# Patient Record
Sex: MALE
[De-identification: names, ages, dates, MRNs, and addresses within clinical notes are randomized per-mention and may not be internally consistent; named-entity substitution may affect disease eponyms.]

---

## 2019-06-26 ENCOUNTER — APPOINTMENT (OUTPATIENT)
Dept: VASCULAR SURGERY | Facility: CLINIC | Age: 19
End: 2019-06-26
Payer: COMMERCIAL

## 2019-06-26 VITALS — HEIGHT: 67 IN | BODY MASS INDEX: 18.83 KG/M2 | WEIGHT: 120 LBS

## 2019-06-26 DIAGNOSIS — R20.0 ANESTHESIA OF SKIN: ICD-10-CM

## 2019-06-26 DIAGNOSIS — R20.2 ANESTHESIA OF SKIN: ICD-10-CM

## 2019-06-26 DIAGNOSIS — Z78.9 OTHER SPECIFIED HEALTH STATUS: ICD-10-CM

## 2019-06-26 PROBLEM — Z00.00 ENCOUNTER FOR PREVENTIVE HEALTH EXAMINATION: Status: ACTIVE | Noted: 2019-06-26

## 2019-06-26 PROCEDURE — 99243 OFF/OP CNSLTJ NEW/EST LOW 30: CPT

## 2019-06-27 PROBLEM — Z78.9 NON-SMOKER: Status: ACTIVE | Noted: 2019-06-26

## 2019-06-27 PROBLEM — R20.0 NUMBNESS AND TINGLING IN LEFT ARM: Status: ACTIVE | Noted: 2019-06-27

## 2019-06-27 PROBLEM — Z78.9 EXERCISES OCCASIONALLY: Status: ACTIVE | Noted: 2019-06-26

## 2019-06-27 RX ORDER — OXYCODONE AND ACETAMINOPHEN 5; 325 MG/1; MG/1
5-325 TABLET ORAL
Qty: 30 | Refills: 0 | Status: ACTIVE | COMMUNITY
Start: 2019-06-21

## 2019-06-27 RX ORDER — MUPIROCIN 20 MG/G
2 OINTMENT TOPICAL
Qty: 22 | Refills: 0 | Status: ACTIVE | COMMUNITY
Start: 2019-06-12

## 2019-06-27 RX ORDER — CHLORHEXIDINE GLUCONATE, 0.12% ORAL RINSE 1.2 MG/ML
0.12 SOLUTION DENTAL
Qty: 946 | Refills: 0 | Status: ACTIVE | COMMUNITY
Start: 2019-06-21

## 2019-06-27 RX ORDER — AMOXICILLIN 875 MG/1
875 TABLET, FILM COATED ORAL
Qty: 14 | Refills: 0 | Status: ACTIVE | COMMUNITY
Start: 2019-06-21

## 2019-06-27 NOTE — HISTORY OF PRESENT ILLNESS
[FreeTextEntry1] : 19 yo male referred by Dr. Ch for thrombophlebitis. The patient reports that he had his wisdom teeth removed last week. He had sedation and IVs placed bilaterally. He noticed numbness in his left forearm and shoulder on Sunday. The numbness improved slightly on Monday and has remained constant 6/10. He contacted the oral surgeon and his pediatrician who thought he likely had thrombophlebitis. He was sent for further vascular evaluation.\par \par Denies fever or chills.

## 2019-06-27 NOTE — ASSESSMENT
[FreeTextEntry1] : 17 yo male with numbness in his upper arm post oral surgery. He has no evidence of thrombophlebiis on physical exam or us. He has no hematoma. He appears to have a numbness in the C5 - C6 dermatome. I am uncertain of the etiology. He will make an appointment with his pediatrician for further evaluation.\par \par

## 2019-06-27 NOTE — CONSULT LETTER
[Dear  ___] : Dear  [unfilled], [Please see my note below.] : Please see my note below. [Consult Closing:] : Thank you very much for allowing me to participate in the care of this patient.  If you have any questions, please do not hesitate to contact me. [Consult Letter:] : I had the pleasure of evaluating your patient, [unfilled]. [FreeTextEntry3] : Bakari Benitez MD, RPVI\par Chief, Vascular Surgery\par  [Sincerely,] : Sincerely, [FreeTextEntry2] : Gil Ch

## 2021-03-16 NOTE — PHYSICAL EXAM
Detail Level: Simple Instructions: This plan will send the code FBSD to the PM system.  DO NOT or CHANGE the price. Price (Do Not Change): 0.00 [JVD] : no jugular venous distention  [Normal Rate and Rhythm] : normal rate and rhythm [2+] : left 2+ [Oriented to Place] : oriented to place [Oriented to Person] : oriented to person [Alert] : alert [Oriented to Time] : oriented to time [de-identified] : Awake and Alert [de-identified] : no gross motor deficit [de-identified] : no erythema, no ecchymosis, no edema [de-identified] : No wheezing [de-identified] : appropriate